# Patient Record
Sex: MALE | Race: OTHER | Employment: FULL TIME | URBAN - NONMETROPOLITAN AREA
[De-identification: names, ages, dates, MRNs, and addresses within clinical notes are randomized per-mention and may not be internally consistent; named-entity substitution may affect disease eponyms.]

---

## 2020-06-23 ENCOUNTER — HOSPITAL ENCOUNTER (INPATIENT)
Age: 25
LOS: 4 days | Discharge: HOME | DRG: 882 | End: 2020-06-27
Attending: PSYCHIATRY & NEUROLOGY | Admitting: PSYCHIATRY & NEUROLOGY

## 2020-06-23 PROBLEM — F32.9 MDD (MAJOR DEPRESSIVE DISORDER), SINGLE EPISODE: Status: ACTIVE | Noted: 2020-06-23

## 2020-06-23 PROCEDURE — 1240000000 HC EMOTIONAL WELLNESS R&B

## 2020-06-23 RX ORDER — IBUPROFEN 400 MG/1
400 TABLET ORAL EVERY 6 HOURS PRN
Status: DISCONTINUED | OUTPATIENT
Start: 2020-06-23 | End: 2020-06-27 | Stop reason: HOSPADM

## 2020-06-23 RX ORDER — HYDROXYZINE HYDROCHLORIDE 25 MG/1
50 TABLET, FILM COATED ORAL 3 TIMES DAILY PRN
Status: DISCONTINUED | OUTPATIENT
Start: 2020-06-23 | End: 2020-06-27 | Stop reason: HOSPADM

## 2020-06-23 RX ORDER — ACETAMINOPHEN 325 MG/1
650 TABLET ORAL EVERY 4 HOURS PRN
Status: DISCONTINUED | OUTPATIENT
Start: 2020-06-23 | End: 2020-06-27 | Stop reason: HOSPADM

## 2020-06-23 RX ORDER — MAGNESIUM HYDROXIDE/ALUMINUM HYDROXICE/SIMETHICONE 120; 1200; 1200 MG/30ML; MG/30ML; MG/30ML
30 SUSPENSION ORAL EVERY 6 HOURS PRN
Status: DISCONTINUED | OUTPATIENT
Start: 2020-06-23 | End: 2020-06-27 | Stop reason: HOSPADM

## 2020-06-23 RX ORDER — TRAZODONE HYDROCHLORIDE 50 MG/1
50 TABLET ORAL NIGHTLY PRN
Status: DISCONTINUED | OUTPATIENT
Start: 2020-06-23 | End: 2020-06-27 | Stop reason: HOSPADM

## 2020-06-23 ASSESSMENT — SLEEP AND FATIGUE QUESTIONNAIRES
AVERAGE NUMBER OF SLEEP HOURS: 8
DO YOU HAVE DIFFICULTY SLEEPING: NO
DO YOU USE A SLEEP AID: NO

## 2020-06-23 ASSESSMENT — PAIN SCALES - GENERAL: PAINLEVEL_OUTOF10: 0

## 2020-06-23 ASSESSMENT — LIFESTYLE VARIABLES: HISTORY_ALCOHOL_USE: NO

## 2020-06-24 PROBLEM — F43.25 ADJUSTMENT DISORDER WITH MIXED DISTURBANCE OF EMOTIONS AND CONDUCT: Status: ACTIVE | Noted: 2020-06-23

## 2020-06-24 PROCEDURE — 1240000000 HC EMOTIONAL WELLNESS R&B

## 2020-06-24 RX ORDER — CITALOPRAM 20 MG/1
20 TABLET ORAL DAILY
Status: DISCONTINUED | OUTPATIENT
Start: 2020-06-24 | End: 2020-06-27 | Stop reason: HOSPADM

## 2020-06-24 ASSESSMENT — LIFESTYLE VARIABLES: HISTORY_ALCOHOL_USE: NO

## 2020-06-24 ASSESSMENT — SLEEP AND FATIGUE QUESTIONNAIRES
DO YOU USE A SLEEP AID: NO
AVERAGE NUMBER OF SLEEP HOURS: 8
DO YOU HAVE DIFFICULTY SLEEPING: NO

## 2020-06-24 ASSESSMENT — PAIN SCALES - GENERAL: PAINLEVEL_OUTOF10: 0

## 2020-06-24 NOTE — PLAN OF CARE
Patient has not attended any of the groups this shift and has not been out of his room for socialization with others today so he has not met his socialization goal.

## 2020-06-24 NOTE — BH NOTE
Behavioral Health   Admission Note     Admission Type:   Admission Type: Involuntary    Reason for admission:  Reason for Admission: MDD    PATIENT STRENGTHS:  Strengths: Employment, Positive Support    Patient Strengths and Limitations:  Limitations: Difficulty problem solving/relies on others to help solve problems    Addictive Behavior:   Addictive Behavior  In the past 3 months, have you felt or has someone told you that you have a problem with:  : None  Do you have a history of Chemical Use?: No  Do you have a history of Alcohol Use?: No  Do you have a history of Street Drug Abuse?: No  Histroy of Prescripton Drug Abuse?: No    Medical Problems:   No past medical history on file. Status EXAM:  Status and Exam  Normal: No  Facial Expression: Flat  Affect: Blunt  Level of Consciousness: Alert  Mood:Normal: No  Mood: Other (Comment)(upset about being here)  Motor Activity:Normal: Yes  Interview Behavior: Cooperative  Preception: [place] to Person, [place] to Time, [place] to Place, [place] to Situation  Attention:Normal: Yes  Thought Processes: Circumstantial  Thought Content:Normal: Yes  Hallucinations: None  Delusions: No  Memory:Normal: Yes  Insight and Judgment: No  Insight and Judgment: Poor Insight  Present Suicidal Ideation: No  Present Homicidal Ideation: No    Pt admitted with followings belongings:  Dentures: None  Vision - Corrective Lenses: None  Hearing Aid: None  Jewelry: None  Body Piercings Removed: N/A  Clothing: None  Were All Patient Medications Collected?: Not Applicable  Other Valuables: Cell phone, Other (Comment)()     Admission order obtained yes. Valuables placed in safe in security envelope, number:  [id number]. Patient oriented to surroundings and program expectations and copy of patient rights given. Received admission packet:  yes. Consents reviewed, signed yes. \"An Important Message from [employer] About Your Rights\" form reviewed, signed na.   Patient verbalize understanding: yes.    Patient education on precautions: yes           Patient screened positive for suicide risk on CSSR-S (\"yes\" to question #4, 5, OR 6)  no. Physician notified of risk score. Orders received yes . Explained patients right to have family, representative or physician notified of their admission. Patient has Declined for physician to be notified. Patient has Declined for family/representative to be notified. Provided pt with [employer] handout entitled \"Quitting Smoking. \"  Reviewed handout with pt addressing dangers of smoking, developing coping skills, and providing basic information about quitting. Pt response to counseling:  Na    Pt brought to unit by j[facility]ica from [facility] under [facility]. EM explained to pt. Pt alert and oriented, speaks clear english but does have trouble understanding some words in writing. Pt denies all and states that he does not remember what happened. He reports that he feels he had a \"dark spell\" or black out. He reports his brother has them. He states that last thing he remembers is seeing his wife in the hospital but he does not know if it was real or a dream and then he went home to sleep and woke up in the hospital himself. Pt minimizes attempt. He denies any stressors, denies any arguments with his wife. He states he is always happy and makes everyone around him happy. Pt is cooperative but upset about not having his phone because he states it calms him.  Pt states he does not feel he needs medication and does not want to take medications          Gonzales Brito RN

## 2020-06-24 NOTE — H&P
[address] [place] [address]                              HISTORY AND PHYSICAL    PATIENT NAME: Chasidy Henriquez                    :        1995  MED REC NO:   [id number]                           ROOM:       [id number]  ACCOUNT NO:   [de-identified]                           ADMIT DATE: 2020  PROVIDER:     Mary Jo Rich M.D. IDENTIFYING INFORMATION:  The patient is a 51-year-old,  [employer] male. He is the father of one daughter. He lives with his wife,  his daughter and his wife's niece. He works at [employer] in [employer]. CHIEF COMPLAINT:  \"I don't know why I am here. \"    HISTORY OF PRESENT ILLNESS:  The patient is a direct admit to the unit  from [facility].  He was transferred from an outside  facility to [facility] after he was found hanging with a  belt at home. According to record, he had an argument with his wife and  he was found 30 minutes later hanging in the garage. EMS had trouble to  intubate him on scene. He was then intubated at the outlying facility  and transferred to [facility].  I saw him on consult at  [facility].    He denies that he had an argument with his wife and he also denies that  he has any history of depression or anxiety. He also denied at [facility] that he was having argument with his wife. He says he  is a happy person. He says he had a blackout and does not remember what  happened. He may be minimizing his depressive symptoms. At [facility] he mentioned about his family, mainly his siblings who  do not care for him. But he says he usually does not talk to them and  he does not feel that he has any stress related to his extended family  members. He denies mood swings or anger outbursts. He also denies feeling  anxious.   He reports at [facility] that he hears own voice  at times telling few  months ago. He says last year he was charged with domestic violence  while he was playing roughly outside with his girlfriend at that time  who is currently his wife. He is a member of a [address] in [address] [place] [address]. MEDICAL AND SURGICAL HISTORY:  Noncontributory, other than recent  suicide attempt by hanging. MEDICATIONS:  Upon admission none. ALLERGIES:  No known drug allergies. PHYSICAL EXAMINATION:  Done at [facility]:  The patient appears stated age, dresses in a  hospital gown. He has good eye contact. Good grooming and fair  hygiene. He is cooperative with the interview, but poorly informative. Speech clear, coherent and spontaneous. Mood euthymic and affect  appropriate. No suicidal or homicidal ideation. No psychosis, although  he may be minimizing his depressive symptoms. No mood swings, no flight  of ideas and no racing thoughts. Thought process is goal-oriented. He  is alert and oriented x3. He has fair attention and concentration. Memory appears to be intact as tested within the context of the  interview. Intelligence appears average. Judgment and insight are  poor. DIAGNOSES:  1. Adjustment disorder with mixed disturbances of emotion and conduct. 2.  Rule out depressive disorder, single episode with psychotic  features. 3.  Limited support from extended family. RECOMMENDATION:  1. Admit to the unit. 2.  Routine labs ordered. 3.  Start Celexa, add trazodone and hydroxyzine. 4.  Risks and benefits of psychotropics discussed as well as alternative  treatment. 5.  Support and reassurance given. 6.  Milieu and group therapy to develop insight into psychiatric illness  and better coping mechanism. 7.  Upon discharge, he will be referred for outpatient management. PATIENT'S STRENGTH:  His wife.         Nallely Rojas M.D.    D: 06/24/2020 13:38:35       T: 06/24/2020 15:13:05     [email or web address]  Job#: [id number] Doc#: [id number]    CC:

## 2020-06-25 PROCEDURE — 1240000000 HC EMOTIONAL WELLNESS R&B

## 2020-06-25 ASSESSMENT — PAIN SCALES - GENERAL: PAINLEVEL_OUTOF10: 0

## 2020-06-25 NOTE — PLAN OF CARE
Problem: Suicide risk  Goal: Provide patient with safe environment  Description: Provide patient with safe environment  6/24/2020 2042 by Merlin Laurent RN  Outcome: Ongoing  Note: Pt remains safe and free of harm  6/24/2020 0928 by Saloni Duong RN  Outcome: Ongoing  Note: Maintained in safe and secure environment. Problem: KNOWLEDGE DEFICIT  Goal: Knowledge - personal safety  6/24/2020 2042 by Merlin Laurent RN  Outcome: Ongoing  Note: Pt remains safe and free of harm  6/24/2020 0928 by Saloni Duong RN  Note: Maintained in safe and secure environment. Problem: Discharge Planning:  Goal: Discharged to appropriate level of care  Description: Discharged to appropriate level of care  6/24/2020 2042 by Merlin Laurent RN  Outcome: Ongoing  Note: Pt plans to be discharged home with family, follow up unknown  6/24/2020 0928 by Saloni Duong RN  Note: Patient voices no needs before discharge. Patient plans to be discharged to home . Discharge planner working with patient to achieve optimal discharge plans, specific to individual needs. Problem: Depressive Behavior With or Without Suicide Precautions:  Goal: Able to verbalize and/or display a decrease in depressive symptoms  Description: Able to verbalize and/or display a decrease in depressive symptoms  6/24/2020 2042 by Merlin Laurent RN  Outcome: Ongoing  Note: Pt denies depression and anxiety  6/24/2020 0928 by Saloni Duong RN  Outcome: Ongoing  Note: Pt affect flat.  Pt blunt and dismissive   Goal: Ability to disclose and discuss suicidal ideas will improve  Description: Ability to disclose and discuss suicidal ideas will improve  6/24/2020 2042 by Merlin Laurent RN  Outcome: Ongoing  Note: Pt denies self harm thoughts  6/24/2020 0928 by Saloni Duong RN  Outcome: Met This Shift  Goal: Able to verbalize support systems  Description: Able to verbalize support systems  6/24/2020 2042 by Merlin Laurent RN  Outcome: Ongoing  Note: Pt reports positive support from family  6/24/2020 6481 by Alexia Infante RN  Outcome: Not Met This Shift  Note: Patient reports family as their support system. Goal: Absence of self-harm  Description: Absence of self-harm  6/24/2020 2042 by Brandyn Gleason RN  Outcome: Ongoing  Note: Pt denies self harm thoughts  6/24/2020 0928 by Alexia Infante RN  Outcome: Ongoing  Note: No self harm behaviors were observed or reported so far this shift. Remains on every 15 minutes precautions for safety. Problem: Medication:  Goal: Compliance with prescribed medication regimen will improve  Description: Compliance with prescribed medication regimen will improve     Outcome: Ongoing  Note: No medications prescribed at this time  Goal: Knowledge of medication regimen will improve  Description: Knowledge of medication regimen will improve     Outcome: Ongoing  Note: No medications prescribed at this time     Problem: Depressive Behavior With or Without Suicide Precautions:  Goal: Patient specific goal  Description: Patient specific goal  6/24/2020 2042 by Brandyn Gleason RN  Outcome: Not Met This Shift  Note: Pt did not attend group or goal wrap up  6/24/2020 0928 by Alexia Infante RN  Outcome: Not Met This Shift  Note: NO goal set at this time   Goal: Participates in care planning  Description: Participates in care planning  6/24/2020 2042 by Brandyn Gleason RN  Outcome: Not Met This Shift  Note: Pt not taking medications, not attending groups, is cooperative with assessments  6/24/2020 0928 by Alexia Infante RN  Note: Patient discussed treatment plan with physician/medical staff, attending group, and compliant with medications.       Problem: Social interaction  Goal: Promoting Socialization  6/24/2020 1441 by Neel Combs  Outcome: Not Met This Shift   Care plan reviewed with patient and  verbalize understanding of the plan of care and contribute to goal setting but states groups \" is not my thing'

## 2020-06-25 NOTE — BH NOTE
PLAN OF CARE:     Start Time: 0900  End Time:  0930    Group Topic:  Daily Goals    Group Type:   Goal Group    Intervention/Goal:  To increase support and identify daily goals    Attendance:   Attended group    Affect:   Brightens with interaction    Behavior:  Pleasant and cooperative     Response:    appropriate    Daily Goal:  To sleep.      Progress:  Progressing to goal

## 2020-06-25 NOTE — PLAN OF CARE
Problem: Suicide risk  Goal: Provide patient with safe environment  Description: Provide patient with safe environment  Outcome: Ongoing  Note: Maintained in safe and secure environment. Problem: KNOWLEDGE DEFICIT  Goal: Knowledge - personal safety  Outcome: Ongoing  Note: Maintained in safe and secure environment. Problem: Medication:  Goal: Compliance with prescribed medication regimen will improve  Description: Compliance with prescribed medication regimen will improve     Outcome: Not Met This Shift  Note: Pt not taking meds at this time . Pt does not feel that he is in need of medications. Goal: Knowledge of medication regimen will improve  Description: Knowledge of medication regimen will improve     Outcome: Not Met This Shift     Problem: Depressive Behavior With or Without Suicide Precautions:  Goal: Ability to disclose and discuss suicidal ideas will improve  Description: Ability to disclose and discuss suicidal ideas will improve  Outcome: Completed  Goal: Absence of self-harm  Description: Absence of self-harm  Outcome: Completed  Note: No self harm behaviors were observed or reported so far this shift. Remains on every 15 minutes precautions for safety. Problem: Discharge Planning:  Goal: Discharged to appropriate level of care  Description: Discharged to appropriate level of care  Note: Patient voices no needs before discharge. Patient plans to be discharged to home with wife and children. Discharge planner working with patient to achieve optimal discharge plans, specific to individual needs. Problem: Depressive Behavior With or Without Suicide Precautions:  Goal: Able to verbalize and/or display a decrease in depressive symptoms  Description: Able to verbalize and/or display a decrease in depressive symptoms  Note: Pt denied any depressive symptoms . Pt said that he does not recall the circumstances that led to his hospitalization.    Goal: Able to verbalize support systems  Description: Able to verbalize support systems  Note: Patient reports his family as their support system. Goal: Patient specific goal  Description: Patient specific goal  Note: Patient reports goal for today is to \" enjoy every minute of life\". Goal: Participates in care planning  Description: Participates in care planning  Note: Patient discussed treatment plan with physician/medical staff, attending group, and compliant with medications.

## 2020-06-26 PROCEDURE — 1240000000 HC EMOTIONAL WELLNESS R&B

## 2020-06-26 PROCEDURE — 6370000000 HC RX 637 (ALT 250 FOR IP): Performed by: PSYCHIATRY & NEUROLOGY

## 2020-06-26 RX ADMIN — TRAZODONE HYDROCHLORIDE 50 MG: 50 TABLET ORAL at 21:36

## 2020-06-26 RX ADMIN — CITALOPRAM 20 MG: 20 TABLET, FILM COATED ORAL at 08:51

## 2020-06-26 RX ADMIN — IBUPROFEN 400 MG: 400 TABLET, FILM COATED ORAL at 21:37

## 2020-06-26 ASSESSMENT — PAIN - FUNCTIONAL ASSESSMENT
PAIN_FUNCTIONAL_ASSESSMENT: PREVENTS OR INTERFERES SOME ACTIVE ACTIVITIES AND ADLS

## 2020-06-26 ASSESSMENT — PAIN SCALES - GENERAL
PAINLEVEL_OUTOF10: 3
PAINLEVEL_OUTOF10: 3
PAINLEVEL_OUTOF10: 0
PAINLEVEL_OUTOF10: 3

## 2020-06-26 ASSESSMENT — PAIN DESCRIPTION - PAIN TYPE
TYPE: ACUTE PAIN

## 2020-06-26 ASSESSMENT — PAIN DESCRIPTION - LOCATION
LOCATION: SHOULDER

## 2020-06-26 ASSESSMENT — PAIN DESCRIPTION - ONSET
ONSET: ON-GOING
ONSET: GRADUAL
ONSET: ON-GOING

## 2020-06-26 ASSESSMENT — PAIN DESCRIPTION - ORIENTATION
ORIENTATION: RIGHT

## 2020-06-26 ASSESSMENT — PAIN DESCRIPTION - PROGRESSION
CLINICAL_PROGRESSION: NOT CHANGED

## 2020-06-26 ASSESSMENT — PAIN DESCRIPTION - DESCRIPTORS
DESCRIPTORS: DISCOMFORT

## 2020-06-26 ASSESSMENT — PAIN DESCRIPTION - FREQUENCY
FREQUENCY: INTERMITTENT

## 2020-06-26 NOTE — PROGRESS NOTES
Daily Progress Note  Sarah Hancock MD  6/26/2020    Reviewed patient's current plan of care and vital signs with nursing staff. Sleep:  8 hours last night  Attending groups: No  No reported Suicidal thought; fair interaction with peers & staff; Mood 10 on a scale of 1 to 10 with 10 is feeling normal.  He refused all meds yesterday & today again. He denies suicidal thoughts now or in the past. Review SW from yesterday & challenged patient about his behavior & possible life stressor. He is agreeable to take Citalopram.    SUBJECTIVE:    Patient is feeling better. SUICIDAL IDEATION denies suicidal ideation. Patient does not have medication side effects. ROS: Patient has new complaints:  No  Sleeping adequately:  Yes  Visitors: No    Mental Status Examination:   Patient is cooperative. Speech normal pitch and normal volume. No abnormal movements, tics or mannerisms. Mood euthymic, affect normal affect. Suicidal ideation Absent. Homicidal ideations Absent. Hallucinations Absent. Delusions Absent. Thought process Goal oriented. Alert and oriented X 3. Attention and concentration fair. MEMORY intact. Insight and Judgement Limited. Data   height is 5' 5\" (1.651 m) and weight is 145 lb (65.8 kg). His tympanic temperature is 97 °F (36.1 °C). His blood pressure is 112/67 and his pulse is 70. His respiration is 17 and oxygen saturation is 97%. Labs:   No results found for any previous visit.             Medications  Current Facility-Administered Medications: citalopram (CELEXA) tablet 20 mg, 20 mg, Oral, Daily  acetaminophen (TYLENOL) tablet 650 mg, 650 mg, Oral, Q4H PRN  ibuprofen (ADVIL;MOTRIN) tablet 400 mg, 400 mg, Oral, Q6H PRN  hydrOXYzine (ATARAX) tablet 50 mg, 50 mg, Oral, TID PRN  traZODone (DESYREL) tablet 50 mg, 50 mg, Oral, Nightly PRN  magnesium hydroxide (MILK OF MAGNESIA) 400 MG/5ML suspension 30 mL, 30 mL, Oral, Daily PRN  aluminum & magnesium hydroxide-simethicone (MAALOX) 200-200-20 MG/5ML suspension 30 mL, 30 mL, Oral, Q6H PRN    ASSESSMENT  Adjustment disorder with mixed disturbance of emotions and conduct     PLAN  Patient s symptoms   are improving  Continue with current medications. Stongly encourage to take at least citalopram.  Attempt to develop insight  Psycho-education conducted. Supportive Therapy conducted.   Probable discharge is tomorrow  Follow-up @ [facility]

## 2020-06-26 NOTE — PLAN OF CARE
Problem: KNOWLEDGE DEFICIT  Goal: Knowledge - personal safety  Outcome: Completed  Note: 1. Warning signs that happen when I am distressed or experience harmful thoughts  lose my mind  2. Activities that I can do to cope in a healthy way when I am stressed or distressed  walk  3. List of roadblocks that keep me from using healthy coping skills  negative thoughts  4.  List of my support persons  family    EMERGENCY CONTACTS:  -National suicide prevention life line 5-198-259-006-652-3533  -24 hour crisis line 3-004-HOPE (0136)  -Domestic violence hotline 9-801-074-SAFE (6062)  -Test CONNECT to 573813 to chat with a trained crisis counselor 24 hours/day 7 days a week  -CALL 348

## 2020-06-27 VITALS
WEIGHT: 145 LBS | TEMPERATURE: 96.5 F | HEART RATE: 70 BPM | SYSTOLIC BLOOD PRESSURE: 115 MMHG | BODY MASS INDEX: 24.16 KG/M2 | DIASTOLIC BLOOD PRESSURE: 71 MMHG | RESPIRATION RATE: 16 BRPM | HEIGHT: 65 IN | OXYGEN SATURATION: 97 %

## 2020-06-27 PROCEDURE — 5130000000 HC BRIDGE APPOINTMENT

## 2020-06-27 PROCEDURE — 6370000000 HC RX 637 (ALT 250 FOR IP): Performed by: PSYCHIATRY & NEUROLOGY

## 2020-06-27 RX ORDER — TRAZODONE HYDROCHLORIDE 50 MG/1
50 TABLET ORAL NIGHTLY PRN
Qty: 30 TABLET | Refills: 0 | Status: SHIPPED | OUTPATIENT
Start: 2020-06-27

## 2020-06-27 RX ORDER — CITALOPRAM 20 MG/1
20 TABLET ORAL DAILY
Qty: 30 TABLET | Refills: 0 | Status: SHIPPED | OUTPATIENT
Start: 2020-06-28

## 2020-06-27 RX ADMIN — CITALOPRAM 20 MG: 20 TABLET, FILM COATED ORAL at 08:51

## 2020-06-27 ASSESSMENT — PAIN DESCRIPTION - FREQUENCY: FREQUENCY: INTERMITTENT

## 2020-06-27 ASSESSMENT — PAIN DESCRIPTION - DESCRIPTORS: DESCRIPTORS: DISCOMFORT

## 2020-06-27 ASSESSMENT — PAIN SCALES - GENERAL: PAINLEVEL_OUTOF10: 5

## 2020-06-27 ASSESSMENT — PAIN DESCRIPTION - ORIENTATION: ORIENTATION: RIGHT

## 2020-06-27 ASSESSMENT — PAIN DESCRIPTION - PROGRESSION: CLINICAL_PROGRESSION: NOT CHANGED

## 2020-06-27 ASSESSMENT — PAIN DESCRIPTION - ONSET: ONSET: ON-GOING

## 2020-06-27 ASSESSMENT — PAIN DESCRIPTION - PAIN TYPE: TYPE: ACUTE PAIN

## 2020-06-27 ASSESSMENT — PAIN - FUNCTIONAL ASSESSMENT: PAIN_FUNCTIONAL_ASSESSMENT: PREVENTS OR INTERFERES SOME ACTIVE ACTIVITIES AND ADLS

## 2020-06-27 ASSESSMENT — PAIN DESCRIPTION - LOCATION: LOCATION: SHOULDER

## 2020-06-27 NOTE — PROGRESS NOTES
Group Therapy Note    Date: 6/26/2020  Start Time: 2000  End Time:  2020    Type of Group: Wrap-Up/relaxation    Patient's Goal:  \"Relax and chill\"    Notes:  Goal met    Status After Intervention:  Unchanged    Participation Level:  Active Listener and Interactive    Participation Quality: Appropriate and Attentive      Speech:  normal      Thought Process/Content: Logical      Affective Functioning: Congruent      Mood: denoes anxiety or depression, rates mood at a 10      Level of consciousness:  Alert and Oriented x4      Response to Learning: Able to verbalize current knowledge/experience, Able to verbalize/acknowledge new learning, Able to retain information, Capable of insight, Able to change behavior and Progressing to goal      Endings: None Reported    Modes of Intervention: Education and Support      Discipline Responsible: Registered Nurse      Signature:  Sharon Raza RN

## 2020-06-27 NOTE — PROGRESS NOTES
[facility]  Discharge Note    Pt discharged with followings belongings:   Dentures: None  Vision - Corrective Lenses: None  Hearing Aid: None  Jewelry: None  Body Piercings Removed: N/A  Clothing: None  Were All Patient Medications Collected?: Not Applicable  Other Valuables: Cell phone, Other (Comment)()   Valuables sent home with patient. Valuables retrieved from safe, Security envelope number:  [id number] and returned to patient. Patient education on aftercare instructions:yes  Patient verbalize understanding of AVS:  Yes  . Status EXAM upon discharge:  Status and Exam  Normal: Yes  Facial Expression: Brightened  Affect: Appropriate, Blunt, Stable(blunt but brightens )  Level of Consciousness: Alert  Mood:Normal: Yes  Mood: Other (Comment)(blunt but brightens, rates mood #10 on 1-10 scale 10 the happiest)  Motor Activity:Normal: Yes  Interview Behavior: Cooperative, Evasive  Preception: [place] to Person, [place] to Time, [place] to Place, [place] to Situation  Attention:Normal: Yes  Thought Processes: Circumstantial  Thought Content:Normal: Yes  Hallucinations: None  Delusions: No  Memory:Normal: No  Memory: Poor Recent  Insight and Judgment: Yes(limited on both at this time)  Insight and Judgment: Other(See comment)(limited but improving)  Present Suicidal Ideation: No  Present Homicidal Ideation: No      Metabolic Screening:    No results found for: LABA1C    No results found for: CHOL  No results found for: TRIG  No results found for: HDL  No components found for: LDLCAL  No results found for: LABVLDL      Patient is blunt but brightens on interaction. He denies suicidal ideation and denies remembering his suicide attempt. He denies alcohol or drug use. He rates his mood today #10 on scale of #1-10 with 10 the happiest.  He expresses understanding of going to [facility] for therapy and to follow up with Dr. Ella Triplett.   He will go to [facility] and get [place] 30 day supply of Celexa

## 2020-06-27 NOTE — PLAN OF CARE
Problem: Discharge Planning:  Goal: Discharged to appropriate level of care  Description: Discharged to appropriate level of care  Outcome: Ongoing  Note: Discharge planning is probable for 6/27/2020 to home and follow-up at [facility]. Problem: Depressive Behavior With or Without Suicide Precautions:  Goal: Able to verbalize and/or display a decrease in depressive symptoms  Description: Able to verbalize and/or display a decrease in depressive symptoms  Outcome: Ongoing  Note: Patient denies depression, rates mood at a 10. Goal: Patient specific goal  Description: Patient specific goal  Outcome: Ongoing  Note: Daily goal met per patient. Goal: Participates in care planning  Description: Participates in care planning  Outcome: Ongoing  Note: Patient participated this shift. Problem: Medication:  Goal: Compliance with prescribed medication regimen will improve  Description: Compliance with prescribed medication regimen will improve     Outcome: Ongoing  Note: Patient compliant. Goal: Knowledge of medication regimen will improve  Description: Knowledge of medication regimen will improve     Outcome: Ongoing  Note: Patient verbalizes understanding of trazodone and motrin. Problem: Pain:  Goal: Pain level will decrease  Description: Pain level will decrease  Outcome: Ongoing  Note: Patient reports right shoulder pain, refer to flow sheets. Problem: KNOWLEDGE DEFICIT,EDUCATION,DISCHARGE PLAN  Goal: Knowledge - personal safety  Outcome: Ongoing  Note: Safety plan not completed this shift. Problem: Activity:  Goal: Sleeping patterns will improve  Description: Sleeping patterns will improve  Outcome: Ongoing  Note: Patient slept 8 hours last night. Care plan reviewed with patient.   Patient does verbalize understanding of the plan of care and does contribute to goal setting

## 2020-06-27 NOTE — DISCHARGE SUMMARY
Physician Discharge Summary     Patient ID:  Candy Coelho  [id number]  7 y.o.  1995    Admit date: 6/23/2020    Discharge date and time: 6/27/2020  11:46 AM     Admitting Physician: Percilla Dandy, MD     Discharge Physician: Lizeth Angeles MD      Admission Diagnoses: MDD (major depressive disorder), single episode [F32.9]    IDENTIFYING INFORMATION: The patient is a 32-year-old,  [employer] male. He is the father of one daughter. He lives with his wife,  his daughter and his wife's niece. He works at [employer] in [email or web address]. HISTORY OF PRESENT ILLNESS: The patient is a direct admit to the unit  from [facility].  He was transferred from an outside  facility to [facility] after he was found hanging with a  belt at home. According to record, he had an argument with his wife and  he was found 30 minutes later hanging in the garage. EMS had trouble to  intubate him on scene. He was then intubated at the outlying facility  and transferred to [facility].  I saw him on consult at  [facility].     He denies that he had an argument with his wife and he also denies that  he has any history of depression or anxiety. He also denied at [facility] that he was having argument with his wife. He says he  is a happy person. He says he had a blackout and does not remember what  happened. He may be minimizing his depressive symptoms. At [facility] he mentioned about his family, mainly his siblings who  do not care for him. But he says he usually does not talk to them and  he does not feel that he has any stress related to his extended family  members.     He denies mood swings or anger outbursts. He also denies feeling  anxious. He reports at [facility] that he hears own voice  at times telling him what to do when he is at work or other times, such  as if he is doing a good job at work or not.   But he denies

## 2020-06-29 ENCOUNTER — TELEPHONE (OUTPATIENT)
Dept: PSYCHIATRY | Age: 25
End: 2020-06-29
